# Patient Record
Sex: MALE
[De-identification: names, ages, dates, MRNs, and addresses within clinical notes are randomized per-mention and may not be internally consistent; named-entity substitution may affect disease eponyms.]

---

## 2021-12-07 ENCOUNTER — RESULT REVIEW (OUTPATIENT)
Age: 66
End: 2021-12-07

## 2021-12-20 PROBLEM — Z00.00 ENCOUNTER FOR PREVENTIVE HEALTH EXAMINATION: Status: ACTIVE | Noted: 2021-12-20

## 2021-12-21 ENCOUNTER — APPOINTMENT (OUTPATIENT)
Dept: UROLOGY | Facility: CLINIC | Age: 66
End: 2021-12-21
Payer: COMMERCIAL

## 2021-12-21 VITALS
TEMPERATURE: 97.3 F | HEART RATE: 89 BPM | HEIGHT: 68 IN | SYSTOLIC BLOOD PRESSURE: 158 MMHG | BODY MASS INDEX: 32.58 KG/M2 | DIASTOLIC BLOOD PRESSURE: 97 MMHG | WEIGHT: 215 LBS

## 2021-12-21 DIAGNOSIS — C61 MALIGNANT NEOPLASM OF PROSTATE: ICD-10-CM

## 2021-12-21 PROCEDURE — 99204 OFFICE O/P NEW MOD 45 MIN: CPT

## 2021-12-21 NOTE — ASSESSMENT
[FreeTextEntry1] : Today we discussed the natural history of localized prostate cancer, and the heterogeneous biology of this malignancy.  We discussed the fact that many prostate cancers are slow growing and unlikely to metastasize or cause death, whereas others can be life threatening.  We reviewed risk stratification, staging, Burke scoring, and PSA levels as they pertain to risk.  \par \par All treatment options were reviewed.  This included active surveillance, androgen deprivation, emerging options such as focal therapy/HIFU/cryotherapy, radiation options (including IMRT, SBRT, brachytherapy) and surgical options (open vs. robotic surgery, nerve vs. non-nerve sparing).  Oncologic outcomes were compared and contrasted.  Risks of biochemical and clinical recurrence discussed.  Risks of needing adjuvant or salvage treatments reviewed.  We discussed the risks of secondary malignancy after radiation.  We discussed the opportunity for pathological staging with surgery vs. other options.  We discussed the possibility of positive margins, treatment failure, cancer recurrence and cancer-related death even with treatment. \par \par All potential side effects of treatment were reviewed including, but not limited to: short term or permanent stress urinary incontinence and/or short term or permanent erectile dysfunction, penile shortening, rectal symptoms/pain, perineal pain, and other side effects. \par \par All potential complications of treatment and surgery were reviewed including, but not limited to: risks of conversion from MIS to open surgery discussed, bleeding//life-threatening hemorrhage, rectal injury requiring colostomy or delayed recognition leading to fistula, vascular/bowel/adjacent visceral organ injury, trocar/access injury, the possibility of recognized vs. unrecognized/delayed-recognition injury, risks of thermal/blunt/sharp/retraction injury, risks of DVT, PE, MI, death, risks of cardiopulmonary/anesthesia related complications, positional injury, infection/collection/abscess, wound complications/dehiscence/seroma/cellulitis, urinoma/fistula, ureteral injury/obstruction, bladder neck contracture, penile shortening, meatal stenosis, urethral stricture, lymphocele, obturator nerve injury, prolonged anastomotic leak, and other complications. \par \par \par \par \par \par \par \par

## 2021-12-21 NOTE — HISTORY OF PRESENT ILLNESS
[FreeTextEntry1] : Dr. Lisandro Huston\par [address], [place] [address]\par \par CC: Prostate cancer\par \par HPI: 65 y/o found to PSA elevation from 4.7 to 10 in 1.5 years. Underwent MRI which showed PIRADS 4 lesion and subsequent biopsy on 12/8/21\par \par MRI 10/7/21: 93cc prostate, PIRADS 4 left pm base\par \par Reports urinary frequency, nocturia 1-2x, occasional urgency, sometimes empties bladder incompletely.  If waits to long to void, then hesitancy and "hard to go".  Occasionally weak stream and strain, especially if very full bladder. \par \par No issues with erections 10/10\par \par 1. Prostate, left lateral base, biopsy\par -Adenocarcinoma of the prostate, Prognostic Grade Group 4 ([place]\par score 4+4=8) involving 75% (8 mm in length) of 1 of 1 core(s).\par \par 2. Prostate, left lateral mid, biopsy\par -Adenocarcinoma of the prostate, Prognostic Grade Group 1 ([place]\par score 3+3=6) involving 40% (4.5 mm in length) of 1 of 1 core(s).\par \par 3. Prostate, left lateral apex, biopsy\par -Adenocarcinoma of the prostate, Prognostic Grade Group 4 ([place]\par score 4+4=8) involving 75% (9 mm in length) of 1 of 1 core(s).\par \par 4. Prostate, left medial base, biopsy\par -Adenocarcinoma of the prostate, Prognostic Grade Group 3 ([place]\par score 4+3=7) involving 60% (8 mm in length) of 1 of 1 core(s). Daryn\par pattern 4 comprises 90% of tumor.\par \par 5. Prostate, left medial mid, biopsy\par -Adenocarcinoma of the prostate, Prognostic Grade Group 3 ([place]\par score 4+3=7) involving 80% (12 mm in length) of 1 of 1 core(s). Daryn\par pattern 4 comprises 70% of tumor.\par \par 6. Prostate, left medial apex, biopsy\par -Adenocarcinoma of the prostate, Prognostic Grade Group 3 ([place]\par score 4+3=7) involving 25% (3 mm in length) of 1 of 1 core(s). Daryn\par pattern 4 comprises 60% of tumor.\par \par 7. Prostate, right medial base, biopsy\par -Benign prostate tissue.\par \par 8. Prostate, right medial mid, biopsy\par -Benign prostate tissue.\par \par 9. Prostate, right medial apex, biopsy\par -Benign prostate tissue.\par \par 10. Prostate, right lateral base, biopsy\par -Benign prostate tissue.\par \par 11. Prostate, right lateral mid, biopsy\par -Benign prostate tissue.\par \par 12. Prostate, right lateral apex, biopsy\par -Benign prostate tissue.\par \par \par 13. Prostate, right posterior base 1, biopsy\par -Benign prostate tissue.\par \par 14. Prostate, right posterior base 2, biopsy\par -Benign prostate tissue.\par \par 15. Prostate, right posterior base 3, biopsy\par -Benign prostate tissue.\par \par 16. Prostate, right posterior base 4, biopsy\par -Benign prostate tissue.\par \par 17. 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

## 2022-01-24 ENCOUNTER — APPOINTMENT (OUTPATIENT)
Dept: UROLOGY | Facility: HOSPITAL | Age: 67
End: 2022-01-24

## 2024-01-06 ENCOUNTER — APPOINTMENT (OUTPATIENT)
Dept: AFTER HOURS CARE | Facility: EMERGENCY ROOM | Age: 69
End: 2024-01-06
Payer: COMMERCIAL

## 2024-01-06 DIAGNOSIS — U07.1 COVID-19: ICD-10-CM

## 2024-01-06 PROCEDURE — 99204 OFFICE O/P NEW MOD 45 MIN: CPT | Mod: 95

## 2024-01-06 RX ORDER — NIRMATRELVIR AND RITONAVIR 300-100 MG
20 X 150 MG & KIT ORAL
Qty: 1 | Refills: 0 | Status: ACTIVE | COMMUNITY
Start: 2024-01-06 | End: 1900-01-01

## 2024-01-06 NOTE — PHYSICAL EXAM
[No Acute Distress] : no acute distress [Well Nourished] : well nourished [Well Developed] : well developed [Well-Appearing] : well-appearing [Normal Sclera/Conjunctiva] : normal sclera/conjunctiva [Normal Outer Ear/Nose] : the outer ears and nose were normal in appearance [No Respiratory Distress] : no respiratory distress  [No Rash] : no rash [No Focal Deficits] : no focal deficits [Normal Affect] : the affect was normal [Normal Insight/Judgement] : insight and judgment were intact

## 2024-01-06 NOTE — REVIEW OF SYSTEMS
[Fever] : no fever [Chills] : chills [Fatigue] : fatigue [Nasal Discharge] : nasal discharge [Chest Pain] : no chest pain [Sore Throat] : sore throat [Shortness Of Breath] : no shortness of breath [Abdominal Pain] : no abdominal pain [Vomiting] : no vomiting [Skin Rash] : no skin rash [Headache] : headache

## 2024-01-06 NOTE — HISTORY OF PRESENT ILLNESS
[Home] : at home, [unfilled] , at the time of the visit. [Other Location: e.g. Home (Enter Location, City,State)___] : at [unfilled] [Verbal consent obtained from patient] : the patient, [unfilled] [FreeTextEntry8] : 68-year-old male with history of prostate cancer currently on amlodipine only for hypertension complaining of testing positive for COVID this morning and requesting treatment.  Patient denies any associate chest pain, shortness of breath, abdominal pain, nausea, vomiting or syncope

## 2024-01-06 NOTE — ASSESSMENT
[FreeTextEntry1] : On virtual exam patient well-appearing and in no acute distress COVID-positive with positive risk factors recommend Paxlovid